# Patient Record
Sex: FEMALE | Race: BLACK OR AFRICAN AMERICAN | NOT HISPANIC OR LATINO | Employment: UNEMPLOYED | RURAL
[De-identification: names, ages, dates, MRNs, and addresses within clinical notes are randomized per-mention and may not be internally consistent; named-entity substitution may affect disease eponyms.]

---

## 2022-11-23 ENCOUNTER — OFFICE VISIT (OUTPATIENT)
Dept: FAMILY MEDICINE | Facility: CLINIC | Age: 22
End: 2022-11-23
Payer: COMMERCIAL

## 2022-11-23 VITALS
OXYGEN SATURATION: 99 % | HEART RATE: 107 BPM | RESPIRATION RATE: 18 BRPM | DIASTOLIC BLOOD PRESSURE: 80 MMHG | TEMPERATURE: 98 F | SYSTOLIC BLOOD PRESSURE: 110 MMHG | WEIGHT: 126 LBS

## 2022-11-23 DIAGNOSIS — J01.00 ACUTE NON-RECURRENT MAXILLARY SINUSITIS: Primary | ICD-10-CM

## 2022-11-23 DIAGNOSIS — R68.89 INFLUENZA-LIKE SYMPTOMS: ICD-10-CM

## 2022-11-23 LAB
CTP QC/QA: YES
FLUAV AG NPH QL: NEGATIVE
FLUBV AG NPH QL: NEGATIVE

## 2022-11-23 PROCEDURE — 1159F PR MEDICATION LIST DOCUMENTED IN MEDICAL RECORD: ICD-10-PCS | Mod: CPTII,,, | Performed by: NURSE PRACTITIONER

## 2022-11-23 PROCEDURE — 3079F PR MOST RECENT DIASTOLIC BLOOD PRESSURE 80-89 MM HG: ICD-10-PCS | Mod: CPTII,,, | Performed by: NURSE PRACTITIONER

## 2022-11-23 PROCEDURE — 1159F MED LIST DOCD IN RCRD: CPT | Mod: CPTII,,, | Performed by: NURSE PRACTITIONER

## 2022-11-23 PROCEDURE — 99203 PR OFFICE/OUTPT VISIT, NEW, LEVL III, 30-44 MIN: ICD-10-PCS | Mod: 25,,, | Performed by: NURSE PRACTITIONER

## 2022-11-23 PROCEDURE — 99203 OFFICE O/P NEW LOW 30 MIN: CPT | Mod: 25,,, | Performed by: NURSE PRACTITIONER

## 2022-11-23 PROCEDURE — 3074F SYST BP LT 130 MM HG: CPT | Mod: CPTII,,, | Performed by: NURSE PRACTITIONER

## 2022-11-23 PROCEDURE — 96372 THER/PROPH/DIAG INJ SC/IM: CPT | Mod: ,,, | Performed by: NURSE PRACTITIONER

## 2022-11-23 PROCEDURE — 3074F PR MOST RECENT SYSTOLIC BLOOD PRESSURE < 130 MM HG: ICD-10-PCS | Mod: CPTII,,, | Performed by: NURSE PRACTITIONER

## 2022-11-23 PROCEDURE — 96372 PR INJECTION,THERAP/PROPH/DIAG2ST, IM OR SUBCUT: ICD-10-PCS | Mod: ,,, | Performed by: NURSE PRACTITIONER

## 2022-11-23 PROCEDURE — 87804 POCT INFLUENZA A/B: ICD-10-PCS | Mod: QW,,, | Performed by: NURSE PRACTITIONER

## 2022-11-23 PROCEDURE — 87804 INFLUENZA ASSAY W/OPTIC: CPT | Mod: QW,,, | Performed by: NURSE PRACTITIONER

## 2022-11-23 PROCEDURE — 3079F DIAST BP 80-89 MM HG: CPT | Mod: CPTII,,, | Performed by: NURSE PRACTITIONER

## 2022-11-23 RX ORDER — BETAMETHASONE SODIUM PHOSPHATE AND BETAMETHASONE ACETATE 3; 3 MG/ML; MG/ML
6 INJECTION, SUSPENSION INTRA-ARTICULAR; INTRALESIONAL; INTRAMUSCULAR; SOFT TISSUE
Status: COMPLETED | OUTPATIENT
Start: 2022-11-23 | End: 2022-11-23

## 2022-11-23 RX ORDER — CEFTRIAXONE 1 G/1
1 INJECTION, POWDER, FOR SOLUTION INTRAMUSCULAR; INTRAVENOUS
Status: COMPLETED | OUTPATIENT
Start: 2022-11-23 | End: 2022-11-23

## 2022-11-23 RX ADMIN — BETAMETHASONE SODIUM PHOSPHATE AND BETAMETHASONE ACETATE 6 MG: 3; 3 INJECTION, SUSPENSION INTRA-ARTICULAR; INTRALESIONAL; INTRAMUSCULAR; SOFT TISSUE at 01:11

## 2022-11-23 RX ADMIN — CEFTRIAXONE 1 G: 1 INJECTION, POWDER, FOR SOLUTION INTRAMUSCULAR; INTRAVENOUS at 01:11

## 2022-11-23 NOTE — PROGRESS NOTES
SOPHIA Fernandez   [facility] [address] [place] [address]  [phone]      PATIENT NAME: Gunner Phillips  : 2000  DATE: 22  MRN: [id number]      Billing Provider: SOPHIA Fernandez  Level of Service:   Patient PCP Information       Provider PCP Type    SOPHIA Fernandez General            Reason for Visit / Chief Complaint: Sinusitis       Update PCP  Update Chief Complaint         History of Present Illness / Problem Focused Workflow     Gunner Phillips presents to the clinic with Sinusitis     Pt presents for sinus congestion, pressure and cough. S/s began 2 days ago. No fever no chills no body aches    Flu negative       Review of Systems     Review of Systems   Constitutional:  Negative for chills, fatigue and fever.   HENT:  Positive for nasal congestion, rhinorrhea and sinus pressure/congestion.    Respiratory:  Positive for cough. Negative for shortness of breath.       Medical / Social / Family History   No past medical history on file.    No past surgical history on file.    Social History  Ms. Phillips      Family History  Ms. Phillips's family history is not on file.    Medications and Allergies     Medications  No outpatient medications have been marked as taking for the 22 encounter (Office Visit) with SOPHIA Fernandez.     Current Facility-Administered Medications for the 22 encounter (Office Visit) with SOPHIA Fernandez   Medication Dose Route Frequency Provider Last Rate Last Admin    [COMPLETED] betamethasone acetate-betamethasone sodium phosphate injection 6 mg  6 mg Intramuscular 1 time in Clinic/HOD SOPHIA Fernandez   6 mg at 22 1352    [COMPLETED] cefTRIAXone injection 1 g  1 g Intramuscular 1 time in Clinic/HOD SOPHIA Fernandez   1 g at 22 1353       Allergies  Review of patient's allergies indicates:  No Known  Allergies    Physical Examination     Vitals:    11/23/22 1323   BP: 110/80   Pulse: 107   Resp: 18   Temp: 98 °F (36.7 °C)     Physical Exam  HENT:      Nose: Congestion present.   Eyes:      Pupils: Pupils are equal, round, and reactive to light.   Cardiovascular:      Rate and Rhythm: Normal rate and regular rhythm.      Heart sounds: No murmur heard.  Pulmonary:      Breath sounds: Normal breath sounds. No wheezing, rhonchi or rales.   Abdominal:      General: Bowel sounds are normal.   Musculoskeletal:         General: No swelling.      Cervical back: Normal range of motion and neck supple.   Neurological:      Mental Status: She is alert and oriented to person, place, and time.        Assessment and Plan (including Health Maintenance)      Problem List  Smart Sets  Document Outside HM   :    Plan:     Uri/sinusitis     Health Maintenance Due   Topic Date Due    Hepatitis C Screening  Never done    Lipid Panel  Never done    COVID-19 Vaccine (1) Never done    HPV Vaccines (1 - 2-dose series) Never done    HIV Screening  Never done    TETANUS VACCINE  Never done    Pap Smear  Never done    Influenza Vaccine (1) Never done       Problem List Items Addressed This Visit    None  Visit Diagnoses       Acute non-recurrent maxillary sinusitis    -  Primary    Relevant Medications    cefTRIAXone injection 1 g (Completed)    betamethasone acetate-betamethasone sodium phosphate injection 6 mg (Completed)    Influenza-like symptoms        Relevant Orders    POCT Influenza A/B (Completed)            The patient has no Health Maintenance topics of status Not Due    No future appointments.         Signature:  SOPHIA Fernandez [facility] [address] [address]  [phone]    Date of encounter: 11/23/22

## 2023-02-01 ENCOUNTER — OFFICE VISIT (OUTPATIENT)
Dept: FAMILY MEDICINE | Facility: CLINIC | Age: 23
End: 2023-02-01
Payer: COMMERCIAL

## 2023-02-01 VITALS
OXYGEN SATURATION: 98 % | WEIGHT: 127 LBS | DIASTOLIC BLOOD PRESSURE: 77 MMHG | RESPIRATION RATE: 16 BRPM | TEMPERATURE: 98 F | SYSTOLIC BLOOD PRESSURE: 116 MMHG | HEART RATE: 96 BPM

## 2023-02-01 DIAGNOSIS — Z20.822 EXPOSURE TO COVID-19 VIRUS: ICD-10-CM

## 2023-02-01 DIAGNOSIS — R05.9 COUGH, UNSPECIFIED TYPE: Primary | ICD-10-CM

## 2023-02-01 LAB
CTP QC/QA: YES
SARS-COV-2 AG RESP QL IA.RAPID: NEGATIVE

## 2023-02-01 PROCEDURE — 3074F PR MOST RECENT SYSTOLIC BLOOD PRESSURE < 130 MM HG: ICD-10-PCS | Mod: CPTII,,, | Performed by: NURSE PRACTITIONER

## 2023-02-01 PROCEDURE — 87426 SARS CORONAVIRUS 2 ANTIGEN POCT: ICD-10-PCS | Mod: QW,,, | Performed by: NURSE PRACTITIONER

## 2023-02-01 PROCEDURE — 1159F PR MEDICATION LIST DOCUMENTED IN MEDICAL RECORD: ICD-10-PCS | Mod: CPTII,,, | Performed by: NURSE PRACTITIONER

## 2023-02-01 PROCEDURE — 3078F PR MOST RECENT DIASTOLIC BLOOD PRESSURE < 80 MM HG: ICD-10-PCS | Mod: CPTII,,, | Performed by: NURSE PRACTITIONER

## 2023-02-01 PROCEDURE — 99213 PR OFFICE/OUTPT VISIT, EST, LEVL III, 20-29 MIN: ICD-10-PCS | Mod: ,,, | Performed by: NURSE PRACTITIONER

## 2023-02-01 PROCEDURE — 3074F SYST BP LT 130 MM HG: CPT | Mod: CPTII,,, | Performed by: NURSE PRACTITIONER

## 2023-02-01 PROCEDURE — 87426 SARSCOV CORONAVIRUS AG IA: CPT | Mod: QW,,, | Performed by: NURSE PRACTITIONER

## 2023-02-01 PROCEDURE — 99213 OFFICE O/P EST LOW 20 MIN: CPT | Mod: ,,, | Performed by: NURSE PRACTITIONER

## 2023-02-01 PROCEDURE — 3078F DIAST BP <80 MM HG: CPT | Mod: CPTII,,, | Performed by: NURSE PRACTITIONER

## 2023-02-01 PROCEDURE — 1159F MED LIST DOCD IN RCRD: CPT | Mod: CPTII,,, | Performed by: NURSE PRACTITIONER

## 2023-02-01 RX ORDER — AZITHROMYCIN 250 MG/1
TABLET, FILM COATED ORAL
Qty: 6 TABLET | Refills: 0 | Status: SHIPPED | OUTPATIENT
Start: 2023-02-01 | End: 2023-02-06

## 2023-02-01 RX ORDER — CETIRIZINE HYDROCHLORIDE 10 MG/1
10 TABLET ORAL DAILY
Qty: 30 TABLET | Refills: 11 | Status: SHIPPED | OUTPATIENT
Start: 2023-02-01 | End: 2024-02-01

## 2023-02-01 RX ORDER — FLUTICASONE PROPIONATE 50 MCG
1 SPRAY, SUSPENSION (ML) NASAL DAILY
Qty: 16 G | Refills: 3 | Status: SHIPPED | OUTPATIENT
Start: 2023-02-01

## 2023-02-01 NOTE — PROGRESS NOTES
SOPHIA Fernandez   [facility] [address] [place] [address]  [phone]      PATIENT NAME: Gunner Phillips  : 2000  DATE: 23  MRN: [id number]      Billing Provider: SOPHIA Fernandez  Level of Service:   Patient PCP Information       Provider PCP Type    SOPHIA Fernandez General            Reason for Visit / Chief Complaint: Sinus Problem, Cough, and exposure to covid       Update PCP  Update Chief Complaint         History of Present Illness / Problem Focused Workflow     Gunner Phillips presents to the clinic with Sinus Problem, Cough, and exposure to covid     Pt presents with sinus congestion, pressure and cough. No fever, chills or bodyaches. No loss of taste or smell.     Recommend change toothbrush and bed linens  Avoid sharing food or drinks  Encourage oral hydration.         Review of Systems     Review of Systems   Constitutional:  Negative for fatigue and fever.   HENT:  Negative for nasal congestion and sore throat.    Eyes:  Negative for visual disturbance.   Respiratory:  Negative for chest tightness and shortness of breath.    Cardiovascular:  Negative for chest pain and leg swelling.   Gastrointestinal:  Negative for abdominal pain, change in bowel habit and change in bowel habit.   Endocrine: Negative for polydipsia, polyphagia and polyuria.   Genitourinary:  Negative for dysuria and hematuria.   Musculoskeletal:  Negative for back pain and leg pain.   Integumentary:  Negative for rash.   Neurological:  Negative for dizziness, syncope, weakness and light-headedness.      Medical / Social / Family History   No past medical history on file.    No past surgical history on file.    Social History  Ms. Phillips      Family History  Ms. Phillips's family history is not on file.    Medications and Allergies     Medications  No outpatient medications have been marked as taking  for the 2/1/23 encounter (Office Visit) with SOPHIA Fernandez.       Allergies  Review of patient's allergies indicates:  No Known Allergies    Physical Examination     Vitals:    02/01/23 1346   BP: 116/77   Pulse: 96   Resp: 16   Temp: 97.7 °F (36.5 °C)     Physical Exam  HENT:      Nose: Congestion present.   Eyes:      Pupils: Pupils are equal, round, and reactive to light.   Cardiovascular:      Rate and Rhythm: Normal rate and regular rhythm.      Heart sounds: Normal heart sounds. No murmur heard.  Pulmonary:      Breath sounds: Normal breath sounds. No wheezing, rhonchi or rales.   Abdominal:      General: Bowel sounds are normal.   Musculoskeletal:         General: No swelling.      Cervical back: Normal range of motion and neck supple.   Skin:     General: Skin is warm and dry.   Neurological:      Mental Status: She is alert and oriented to person, place, and time.        Assessment and Plan (including Health Maintenance)      Problem List  Smart Sets  Document Outside HM   :    Plan:   1. Cough, unspecified type  -     SARS Coronavirus 2 Antigen, POCT    2. Exposure to COVID-19 virus  -     SARS Coronavirus 2 Antigen, POCT    Other orders  -     azithromycin (Z-LINNEA) 250 MG tablet; Take 2 tablets by mouth on day 1; Take 1 tablet by mouth on days 2-5  Dispense: 6 tablet; Refill: 0  -     fluticasone propionate (FLONASE) 50 mcg/actuation nasal spray; 1 spray (50 mcg total) by Each Nostril route once daily.  Dispense: 16 g; Refill: 3  -     cetirizine (ZYRTEC) 10 MG tablet; Take 1 tablet (10 mg total) by mouth once daily.  Dispense: 30 tablet; Refill: 11           Health Maintenance Due   Topic Date Due    Hepatitis C Screening  Never done    Lipid Panel  Never done    COVID-19 Vaccine (1) Never done    HPV Vaccines (1 - 2-dose series) Never done    HIV Screening  Never done    TETANUS VACCINE  Never done    Pap Smear  Never done    Influenza Vaccine (1) Never done       Problem List Items Addressed  This Visit    None  Visit Diagnoses       Cough, unspecified type    -  Primary    Relevant Orders    SARS Coronavirus 2 Antigen, POCT (Completed)    Exposure to COVID-19 virus        Relevant Orders    SARS Coronavirus 2 Antigen, POCT (Completed)            The patient has no Health Maintenance topics of status Not Due    No future appointments.         Signature:  SOPHIA Fernandez [facility] [address] [place] [address]  [phone]    Date of encounter: 2/1/23

## 2023-03-18 ENCOUNTER — OFFICE VISIT (OUTPATIENT)
Dept: FAMILY MEDICINE | Facility: CLINIC | Age: 23
End: 2023-03-18
Payer: COMMERCIAL

## 2023-03-18 VITALS
SYSTOLIC BLOOD PRESSURE: 114 MMHG | HEIGHT: 64 IN | HEART RATE: 88 BPM | TEMPERATURE: 98 F | OXYGEN SATURATION: 99 % | WEIGHT: 127 LBS | DIASTOLIC BLOOD PRESSURE: 72 MMHG | BODY MASS INDEX: 21.68 KG/M2 | RESPIRATION RATE: 18 BRPM

## 2023-03-18 DIAGNOSIS — R60.0 LEG EDEMA, RIGHT: ICD-10-CM

## 2023-03-18 DIAGNOSIS — S93.401A SPRAIN OF RIGHT ANKLE, UNSPECIFIED LIGAMENT, INITIAL ENCOUNTER: Primary | ICD-10-CM

## 2023-03-18 DIAGNOSIS — M25.571 ACUTE RIGHT ANKLE PAIN: ICD-10-CM

## 2023-03-18 LAB — D DIMER PPP FEU-MCNC: 1.79 ΜG/ML (ref 0–0.47)

## 2023-03-18 PROCEDURE — 1160F RVW MEDS BY RX/DR IN RCRD: CPT | Mod: ,,, | Performed by: FAMILY MEDICINE

## 2023-03-18 PROCEDURE — 3008F BODY MASS INDEX DOCD: CPT | Mod: ,,, | Performed by: FAMILY MEDICINE

## 2023-03-18 PROCEDURE — 1160F PR REVIEW ALL MEDS BY PRESCRIBER/CLIN PHARMACIST DOCUMENTED: ICD-10-PCS | Mod: ,,, | Performed by: FAMILY MEDICINE

## 2023-03-18 PROCEDURE — 3074F PR MOST RECENT SYSTOLIC BLOOD PRESSURE < 130 MM HG: ICD-10-PCS | Mod: ,,, | Performed by: FAMILY MEDICINE

## 2023-03-18 PROCEDURE — 1159F MED LIST DOCD IN RCRD: CPT | Mod: ,,, | Performed by: FAMILY MEDICINE

## 2023-03-18 PROCEDURE — 99051 PR MEDICAL SERVICES, EVE/WKEND/HOLIDAY: ICD-10-PCS | Mod: ,,, | Performed by: FAMILY MEDICINE

## 2023-03-18 PROCEDURE — 96372 PR INJECTION,THERAP/PROPH/DIAG2ST, IM OR SUBCUT: ICD-10-PCS | Mod: ,,, | Performed by: FAMILY MEDICINE

## 2023-03-18 PROCEDURE — 96372 THER/PROPH/DIAG INJ SC/IM: CPT | Mod: ,,, | Performed by: FAMILY MEDICINE

## 2023-03-18 PROCEDURE — 99213 OFFICE O/P EST LOW 20 MIN: CPT | Mod: 25,,, | Performed by: FAMILY MEDICINE

## 2023-03-18 PROCEDURE — 3078F PR MOST RECENT DIASTOLIC BLOOD PRESSURE < 80 MM HG: ICD-10-PCS | Mod: ,,, | Performed by: FAMILY MEDICINE

## 2023-03-18 PROCEDURE — 99051 MED SERV EVE/WKEND/HOLIDAY: CPT | Mod: ,,, | Performed by: FAMILY MEDICINE

## 2023-03-18 PROCEDURE — 99213 PR OFFICE/OUTPT VISIT, EST, LEVL III, 20-29 MIN: ICD-10-PCS | Mod: 25,,, | Performed by: FAMILY MEDICINE

## 2023-03-18 PROCEDURE — 1159F PR MEDICATION LIST DOCUMENTED IN MEDICAL RECORD: ICD-10-PCS | Mod: ,,, | Performed by: FAMILY MEDICINE

## 2023-03-18 PROCEDURE — 3008F PR BODY MASS INDEX (BMI) DOCUMENTED: ICD-10-PCS | Mod: ,,, | Performed by: FAMILY MEDICINE

## 2023-03-18 PROCEDURE — 3074F SYST BP LT 130 MM HG: CPT | Mod: ,,, | Performed by: FAMILY MEDICINE

## 2023-03-18 PROCEDURE — 3078F DIAST BP <80 MM HG: CPT | Mod: ,,, | Performed by: FAMILY MEDICINE

## 2023-03-18 RX ORDER — DICLOFENAC SODIUM 75 MG/1
75 TABLET, DELAYED RELEASE ORAL 2 TIMES DAILY PRN
Qty: 20 TABLET | Refills: 0 | Status: SHIPPED | OUTPATIENT
Start: 2023-03-18

## 2023-03-18 RX ORDER — KETOROLAC TROMETHAMINE 30 MG/ML
30 INJECTION, SOLUTION INTRAMUSCULAR; INTRAVENOUS
Status: COMPLETED | OUTPATIENT
Start: 2023-03-18 | End: 2023-03-18

## 2023-03-18 RX ORDER — PREDNISONE 20 MG/1
20 TABLET ORAL DAILY
Qty: 5 TABLET | Refills: 0 | Status: SHIPPED | OUTPATIENT
Start: 2023-03-18 | End: 2023-03-23

## 2023-03-18 RX ADMIN — KETOROLAC TROMETHAMINE 30 MG: 30 INJECTION, SOLUTION INTRAMUSCULAR; INTRAVENOUS at 11:03
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
## 2023-03-19 DIAGNOSIS — R79.89 D-DIMER, ELEVATED: Primary | ICD-10-CM

## 2023-03-20 ENCOUNTER — HOSPITAL ENCOUNTER (OUTPATIENT)
Dept: RADIOLOGY | Facility: HOSPITAL | Age: 23
Discharge: HOME | End: 2023-03-20
Attending: FAMILY MEDICINE
Payer: COMMERCIAL

## 2023-03-20 DIAGNOSIS — R79.89 D-DIMER, ELEVATED: ICD-10-CM

## 2023-03-20 PROCEDURE — 93971 US LOWER EXTREMITY VEINS RIGHT: ICD-10-PCS | Mod: 26,RT,, | Performed by: RADIOLOGY

## 2023-03-20 PROCEDURE — 93971 EXTREMITY STUDY: CPT | Mod: TC,RT

## 2023-03-20 PROCEDURE — 93971 EXTREMITY STUDY: CPT | Mod: 26,RT,, | Performed by: RADIOLOGY

## 2023-03-27 DIAGNOSIS — M25.571 ACUTE RIGHT ANKLE PAIN: Primary | ICD-10-CM

## 2023-03-28 ENCOUNTER — OFFICE VISIT (OUTPATIENT)
Dept: ORTHOPEDICS | Facility: CLINIC | Age: 23
End: 2023-03-28
Payer: COMMERCIAL

## 2023-03-28 ENCOUNTER — HOSPITAL ENCOUNTER (OUTPATIENT)
Dept: RADIOLOGY | Facility: HOSPITAL | Age: 23
Discharge: HOME | End: 2023-03-28
Attending: ORTHOPAEDIC SURGERY
Payer: COMMERCIAL

## 2023-03-28 DIAGNOSIS — S93.401A SPRAIN OF RIGHT ANKLE, UNSPECIFIED LIGAMENT, INITIAL ENCOUNTER: ICD-10-CM

## 2023-03-28 DIAGNOSIS — M25.571 ACUTE RIGHT ANKLE PAIN: ICD-10-CM

## 2023-03-28 PROCEDURE — 99213 OFFICE O/P EST LOW 20 MIN: CPT | Mod: PBBFAC | Performed by: ORTHOPAEDIC SURGERY

## 2023-03-28 PROCEDURE — 99203 PR OFFICE/OUTPT VISIT, NEW, LEVL III, 30-44 MIN: ICD-10-PCS | Mod: S$PBB,,, | Performed by: ORTHOPAEDIC SURGERY

## 2023-03-28 PROCEDURE — 73610 X-RAY EXAM OF ANKLE: CPT | Mod: 26,RT,, | Performed by: ORTHOPAEDIC SURGERY

## 2023-03-28 PROCEDURE — 73610 X-RAY EXAM OF ANKLE: CPT | Mod: TC,RT

## 2023-03-28 PROCEDURE — 73610 XR ANKLE COMPLETE 3 VIEW RIGHT: ICD-10-PCS | Mod: 26,RT,, | Performed by: ORTHOPAEDIC SURGERY

## 2023-03-28 PROCEDURE — 99203 OFFICE O/P NEW LOW 30 MIN: CPT | Mod: S$PBB,,, | Performed by: ORTHOPAEDIC SURGERY
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
## 2023-04-05 ENCOUNTER — CLINICAL SUPPORT (OUTPATIENT)
Dept: REHABILITATION | Facility: HOSPITAL | Age: 23
End: 2023-04-05
Payer: COMMERCIAL

## 2023-04-05 DIAGNOSIS — R26.2 DIFFICULTY WALKING: ICD-10-CM

## 2023-04-05 DIAGNOSIS — S93.401A SPRAIN OF RIGHT ANKLE, UNSPECIFIED LIGAMENT, INITIAL ENCOUNTER: ICD-10-CM

## 2023-04-05 DIAGNOSIS — M25.671 STIFFNESS OF RIGHT ANKLE JOINT: ICD-10-CM

## 2023-04-05 DIAGNOSIS — M25.571 ACUTE RIGHT ANKLE PAIN: Primary | ICD-10-CM

## 2023-04-05 PROCEDURE — 97110 THERAPEUTIC EXERCISES: CPT

## 2023-04-05 PROCEDURE — 97140 MANUAL THERAPY 1/> REGIONS: CPT

## 2023-04-05 PROCEDURE — 97162 PT EVAL MOD COMPLEX 30 MIN: CPT
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
## 2023-04-17 ENCOUNTER — CLINICAL SUPPORT (OUTPATIENT)
Dept: REHABILITATION | Facility: HOSPITAL | Age: 23
End: 2023-04-17
Payer: COMMERCIAL

## 2023-04-17 DIAGNOSIS — S93.401A SPRAIN OF RIGHT ANKLE, UNSPECIFIED LIGAMENT, INITIAL ENCOUNTER: Primary | ICD-10-CM

## 2023-04-17 PROCEDURE — 97110 THERAPEUTIC EXERCISES: CPT | Mod: CQ

## 2023-04-17 PROCEDURE — 97140 MANUAL THERAPY 1/> REGIONS: CPT | Mod: CQ

## 2023-04-17 PROCEDURE — 97112 NEUROMUSCULAR REEDUCATION: CPT | Mod: CQ

## 2023-04-17 NOTE — PROGRESS NOTES
[facility]   Physical Therapy Treatment Note     Name: Gunner Phillips  Clinic Number: [id number]    Therapy Diagnosis:   Encounter Diagnosis   Name Primary?    Sprain of right ankle, unspecified ligament, initial encounter Yes     Physician: Alonzo Sanz III, MD    Visit Date: 4/17/2023    Encounter Diagnosis   Name Primary?    Sprain of right ankle, unspecified ligament, initial encounter        Physician: Alonzo Sanz III, MD     Physician Orders: PT Eval and Treat  Medical Diagnosis from Referral: sprain of right ankle, unspecified ligament.   Evaluation Date: 4/5/2023  Updated Plan of Care Due : 05/05/23  Authorization Period Expiration: 03/27/24  Plan of Care Expiration: 05/05/23    Visit # / Visits authorized: 2 / 20  PTA Visit #: 1/5     Time In: 4:40 pm  Time Out: 5:13 pm  Total Billable Time: 33 minutes    SUBJECTIVE     Pt reports: she is doing good. Some pain today. Reports she gets out of the boot daily.  She was compliant with home exercise program.  Response to previous treatment: First since eval  Functional change: None    Pain: 3/10  Location: right ankles     Prior Level of Function: No limitations  Current Level of Function: WBAT in boot currently     OBJECTIVE     Objective Measures updated at progress report unless specified.     Treatment     Gunner received the treatments listed below:      therapeutic exercises to develop strength, endurance, ROM, and flexibility for 13 minutes including:  Bike x 3 minutes  SB Stretch 3x20 seconds    Calf Raises 20x  Seated soleus raises - 3 plates 20x  Single leg bridges - 30x        manual therapy techniques: Joint mobilizations and Manual traction were applied to the: ankle for 8 minutes, including:  Joint mobs  Joint distraction  Ankle PROM    neuromuscular re-education activities to improve: Balance, Kinesthetic, and Proprioception for 15 minutes. The following activities were included:  Single Leg Balance on Foam  "Pad - 3x30 seconds  Ankle Inversion with Band - green 20x  Ankle Eversion with Band - green 20x  Leg Press (R) 4 plates 20x with rock onto heels  Lateral Steps 6" 20x    therapeutic activities to improve functional performance for 0  minutes, including:      gait training to improve functional mobility and safety for 0  minutes, including:        Patient Education and Home Exercises     Home Exercises Provided and Patient Education Provided     Education provided:   - None    Written Home Exercises Provided: Patient instructed to cont prior HEP. Exercises were reviewed and Gunner was able to demonstrate them prior to the end of the session.  Gunner demonstrated good  understanding of the education provided. See EMR under Patient Instructions for exercises provided during therapy sessions    ASSESSMENT     Pt is doing well and able to get out of the boot entire session. Pt has antalgic gait pattern when ambulating. Pt has limited ankle mobility especially during inversion/eversion. Pt is doing well and encouraged to get out of the boot more and more. Will continue to progress as able.       PMH:  Gunner is a 22 y.o. female referred to outpatient Physical Therapy with a medical diagnosis of sprain of R ankle. Patient presents with signs and symptoms consistent with this diagnosis at this point really just seeming to be dealing with stiffness and weakness from prolonged boot usage.  Discussed with pt and her mom getting a foot orthotic and weaning out of the boot.  Have her going back to a single crutch first two days out of the boot and then will have her walking in just lace up brace from there.  With decreased R ankle ROM, strength, flexibility, and functional capacity which will be addressed through skilled therapy at this time.     Gunner Is progressing towards her goals.   Pt prognosis is Good.     Pt will continue to benefit from skilled outpatient physical therapy to address the deficits listed in the problem " "list box on initial evaluation, provide pt/family education and to maximize pt's level of independence in the home and community environment.     Pt's spiritual, cultural and educational needs considered and pt agreeable to plan of care and goals.     Anticipated barriers to physical therapy: None    Goals: Short Term Goals: 2 weeks   Pt will be independent with HEP for continued progression outside of skilled therapy.  Pt will be able to amb I without pain or deviation out of boot and without crutch without pain or limitation from R LE.  Pt will demonstrate 10 deg dorsiflexion without pain to show sufficient dorsiflexion range for functional activities.      Long Term Goals: 4 weeks   Pt will be able to perform a lateral step down from a 6" step without pain or limitation from R LE.  Pt will be able to perform 20 single leg calf raises to demonstrate 5/5 plantar flexor strength.  Pt will be able to perform 30 ankle hops without pain or limitation to allow for safe return to basketball.   Pt will be able to perform anterior Y balance reach test within 2cm of uninvolved side to demonstrate decreased risk for future injury.     PLAN     Plan of care Certification: 4/5/2023 to 5/5/23.     Outpatient Physical Therapy 2 times weekly for 4 weeks to include the following interventions: Manual Therapy, Neuromuscular Re-ed, Patient Education, Therapeutic Activities, and Therapeutic Exercise.        Brennen Bear, PTA   04/17/2023     "

## 2023-04-19 ENCOUNTER — CLINICAL SUPPORT (OUTPATIENT)
Dept: REHABILITATION | Facility: HOSPITAL | Age: 23
End: 2023-04-19
Payer: COMMERCIAL

## 2023-04-19 DIAGNOSIS — S93.401A SPRAIN OF RIGHT ANKLE, UNSPECIFIED LIGAMENT, INITIAL ENCOUNTER: Primary | ICD-10-CM

## 2023-04-19 PROCEDURE — 97140 MANUAL THERAPY 1/> REGIONS: CPT | Mod: CQ

## 2023-04-19 PROCEDURE — 97110 THERAPEUTIC EXERCISES: CPT | Mod: CQ

## 2023-04-19 PROCEDURE — 97112 NEUROMUSCULAR REEDUCATION: CPT | Mod: CQ

## 2023-04-19 NOTE — PROGRESS NOTES
"[facility]   Physical Therapy Treatment Note     Name: Gunner Phillips  Clinic Number: [id number]    Therapy Diagnosis:   Encounter Diagnosis   Name Primary?    Sprain of right ankle, unspecified ligament, initial encounter Yes     Physician: Alonzo Sanz III, MD    Visit Date: 4/19/2023    Encounter Diagnosis   Name Primary?    Sprain of right ankle, unspecified ligament, initial encounter        Physician: Alonzo Sanz III, MD     Physician Orders: PT Eval and Treat  Medical Diagnosis from Referral: sprain of right ankle, unspecified ligament.   Evaluation Date: 4/5/2023  Updated Plan of Care Due : 05/05/23  Authorization Period Expiration: 03/27/24  Plan of Care Expiration: 05/05/23    Visit # / Visits authorized: 3 / 20  PTA Visit #: 2/5     Time In: 4:48 pm  Time Out: 5:23 pm  Total Billable Time: 35 minutes    SUBJECTIVE     Pt reports: she is doing good. Some pain today. Reports she gets out of the boot daily.  She was compliant with home exercise program.  Response to previous treatment: First since eval  Functional change: None    Pain: 3/10  Location: right ankles     Prior Level of Function: No limitations  Current Level of Function: WBAT in boot currently     OBJECTIVE     Objective Measures updated at progress report unless specified.     Treatment     Gunner received the treatments listed below:      therapeutic exercises to develop strength, endurance, ROM, and flexibility for 12 minutes including:  Bike x 5 minutes  SB Stretch 3x20 seconds    Calf Raises 30x  Seated soleus raises - 3 plates 20x  Single leg bridges - 30x  Lateral Steps 6" 20x      manual therapy techniques: Joint mobilizations and Manual traction were applied to the: ankle for 8 minutes, including:  Joint mobs  Joint distraction  Ankle PROM    neuromuscular re-education activities to improve: Balance, Kinesthetic, and Proprioception for 15 minutes. The following activities were included:  Single " Leg Balance on Foam Pad - 3x30 seconds  Ankle Inversion with Band - green 20x  Ankle Eversion with Band - green 20x  Leg Press (R) 4 plates 20x with rock onto heels  Rebounds x 30 single leg    therapeutic activities to improve functional performance for 0  minutes, including:      gait training to improve functional mobility and safety for 0  minutes, including:        Patient Education and Home Exercises     Home Exercises Provided and Patient Education Provided     Education provided:   - None    Written Home Exercises Provided: Patient instructed to cont prior HEP. Exercises were reviewed and Gunner was able to demonstrate them prior to the end of the session.  Gunner demonstrated good  understanding of the education provided. See EMR under Patient Instructions for exercises provided during therapy sessions    ASSESSMENT     Pt is doing well and making great progress towards her goals. Still a little tender over ATFL but no wincing present with testing. Pt has some pain with resisted eversion. Pt able to perform unsupported balance exercises today. Will continue to progress as able.       PMH:  Gunner is a 22 y.o. female referred to outpatient Physical Therapy with a medical diagnosis of sprain of R ankle. Patient presents with signs and symptoms consistent with this diagnosis at this point really just seeming to be dealing with stiffness and weakness from prolonged boot usage.  Discussed with pt and her mom getting a foot orthotic and weaning out of the boot.  Have her going back to a single crutch first two days out of the boot and then will have her walking in just lace up brace from there.  With decreased R ankle ROM, strength, flexibility, and functional capacity which will be addressed through skilled therapy at this time.     Gunner Is progressing towards her goals.   Pt prognosis is Good.     Pt will continue to benefit from skilled outpatient physical therapy to address the deficits listed in the  "problem list box on initial evaluation, provide pt/family education and to maximize pt's level of independence in the home and community environment.     Pt's spiritual, cultural and educational needs considered and pt agreeable to plan of care and goals.     Anticipated barriers to physical therapy: None    Goals: Short Term Goals: 2 weeks   Pt will be independent with HEP for continued progression outside of skilled therapy.  Pt will be able to amb I without pain or deviation out of boot and without crutch without pain or limitation from R LE.  Pt will demonstrate 10 deg dorsiflexion without pain to show sufficient dorsiflexion range for functional activities.      Long Term Goals: 4 weeks   Pt will be able to perform a lateral step down from a 6" step without pain or limitation from R LE.  Pt will be able to perform 20 single leg calf raises to demonstrate 5/5 plantar flexor strength.  Pt will be able to perform 30 ankle hops without pain or limitation to allow for safe return to basketball.   Pt will be able to perform anterior Y balance reach test within 2cm of uninvolved side to demonstrate decreased risk for future injury.     PLAN     Plan of care Certification: 4/5/2023 to 5/5/23.     Outpatient Physical Therapy 2 times weekly for 4 weeks to include the following interventions: Manual Therapy, Neuromuscular Re-ed, Patient Education, Therapeutic Activities, and Therapeutic Exercise.        Brennen Bear, PTA   04/19/2023       "

## 2023-04-24 ENCOUNTER — CLINICAL SUPPORT (OUTPATIENT)
Dept: REHABILITATION | Facility: HOSPITAL | Age: 23
End: 2023-04-24
Payer: COMMERCIAL

## 2023-04-24 DIAGNOSIS — M25.671 STIFFNESS OF RIGHT ANKLE JOINT: ICD-10-CM

## 2023-04-24 DIAGNOSIS — M25.571 ACUTE RIGHT ANKLE PAIN: Primary | ICD-10-CM

## 2023-04-24 DIAGNOSIS — R26.2 DIFFICULTY WALKING: ICD-10-CM

## 2023-04-24 PROCEDURE — 97140 MANUAL THERAPY 1/> REGIONS: CPT

## 2023-04-24 PROCEDURE — 97112 NEUROMUSCULAR REEDUCATION: CPT

## 2023-04-24 PROCEDURE — 97530 THERAPEUTIC ACTIVITIES: CPT

## 2023-04-24 PROCEDURE — 97110 THERAPEUTIC EXERCISES: CPT

## 2023-04-25 NOTE — PROGRESS NOTES
[facility]   Physical Therapy Treatment Note     Name: Gunner Phillips  Clinic Number: [id number]    Therapy Diagnosis:   No diagnosis found.    Physician: Alonzo Sanz III, MD    Visit Date: 4/24/2023    Encounter Diagnosis   Name Primary?    Sprain of right ankle, unspecified ligament, initial encounter        Physician: Alonzo Sanz III, MD     Physician Orders: PT Eval and Treat  Medical Diagnosis from Referral: sprain of right ankle, unspecified ligament.   Evaluation Date: 4/5/2023  Updated Plan of Care Due : 05/05/23  Authorization Period Expiration: 03/27/24  Plan of Care Expiration: 05/05/23    Visit # / Visits authorized: 4/ 20  PTA Visit #: 0/5     Time In: 4:37 pm  Time Out: 5:18 pm  Total Billable Time: 41 minutes    SUBJECTIVE     Pt reports: reports ankle feeling good at this point no longer using the boot.  Still wearing lace up brace full time.   She was compliant with home exercise program.  Response to previous treatment: good response with mod soreness after.   Functional change: Out of boot    Prior Level of Function: No limitations  Current Level of Function: WBAT just in brace at this point    OBJECTIVE     Able to SLS for 15 seconds today without pain.     Treatment     Gunner received the treatments listed below:      therapeutic exercises to develop strength, endurance, ROM, and flexibility for 15 minutes including:  Bike x 5 minutes  Slantboard Stretch 4x10wbr  Ankle Circles x30 cw/ccw w/ grey band  Heel Raises at stairs 2x20  Heel Drop Stretch [id number]  Seated Heel Raises 2x20 w/ 40lb weight      manual therapy techniques: Joint mobilizations and Manual traction were applied to the: ankle for 8 minutes, including:  Joint mobs  Joint distraction  Ankle PROM    neuromuscular re-education activities to improve: Balance, Kinesthetic, and Proprioception for 8 minutes. The following activities were included:  Single Leg Balance on Foam Pad - 3x30  "seconds  Leg Press [id number] w/ controlled eccentric    therapeutic activities to improve functional performance for 10 minutes, including:  TRX Squats 3x10 w/ chair behind to improve squatting ability  Step Ups 4x5 to 6" to improve stair negotiation  Trampoline Bounces [id number] for slow progression to plyos.     Patient Education and Home Exercises     Home Exercises Provided and Patient Education Provided     Education provided:   - None    Written Home Exercises Provided: Patient instructed to cont prior HEP. Exercises were reviewed and Gunner was able to demonstrate them prior to the end of the session.  Gunner demonstrated good  understanding of the education provided. See EMR under Patient Instructions for exercises provided during therapy sessions    ASSESSMENT     Doing well at this point with good improvement now that she is moving freely out of the boot at home.  Do think as she continues to challenge herself with daily activities she will continue to make good natural progress.  Did well with all exercises performed today without pain but some difficulty due to tightness and weakness.  Will continue with current plan at this time but did discuss discharge with pt and her dad at next visit.  Will see how she responds to today's visit and make determination next time.     Gunner Is progressing towards her goals.   Pt prognosis is Good.     Pt will continue to benefit from skilled outpatient physical therapy to address the deficits listed in the problem list box on initial evaluation, provide pt/family education and to maximize pt's level of independence in the home and community environment.     Pt's spiritual, cultural and educational needs considered and pt agreeable to plan of care and goals.     Anticipated barriers to physical therapy: None    Goals: Short Term Goals: 2 weeks   Pt will be independent with HEP for continued progression outside of skilled therapy.  Pt will be able to amb I without pain " "or deviation out of boot and without crutch without pain or limitation from R LE.  Pt will demonstrate 10 deg dorsiflexion without pain to show sufficient dorsiflexion range for functional activities.      Long Term Goals: 4 weeks   Pt will be able to perform a lateral step down from a 6" step without pain or limitation from R LE.  Pt will be able to perform 20 single leg calf raises to demonstrate 5/5 plantar flexor strength.  Pt will be able to perform 30 ankle hops without pain or limitation to allow for safe return to basketball.   Pt will be able to perform anterior Y balance reach test within 2cm of uninvolved side to demonstrate decreased risk for future injury.     PLAN     Plan of care Certification: 4/5/2023 to 5/5/23.     Outpatient Physical Therapy 2 times weekly for 4 weeks to include the following interventions: Manual Therapy, Neuromuscular Re-ed, Patient Education, Therapeutic Activities, and Therapeutic Exercise.        Mathew Quinn, PT   04/25/2023         "

## 2023-04-26 ENCOUNTER — CLINICAL SUPPORT (OUTPATIENT)
Dept: REHABILITATION | Facility: HOSPITAL | Age: 23
End: 2023-04-26
Payer: COMMERCIAL

## 2023-04-26 DIAGNOSIS — M25.571 ACUTE RIGHT ANKLE PAIN: Primary | ICD-10-CM

## 2023-04-26 DIAGNOSIS — M25.671 STIFFNESS OF RIGHT ANKLE JOINT: ICD-10-CM

## 2023-04-26 DIAGNOSIS — R26.2 DIFFICULTY WALKING: ICD-10-CM

## 2023-04-26 DIAGNOSIS — S93.401A SPRAIN OF RIGHT ANKLE, UNSPECIFIED LIGAMENT, INITIAL ENCOUNTER: ICD-10-CM

## 2023-04-26 PROCEDURE — 97140 MANUAL THERAPY 1/> REGIONS: CPT

## 2023-04-26 PROCEDURE — 97110 THERAPEUTIC EXERCISES: CPT

## 2023-04-26 PROCEDURE — 97530 THERAPEUTIC ACTIVITIES: CPT

## 2023-04-26 PROCEDURE — 97112 NEUROMUSCULAR REEDUCATION: CPT

## 2023-04-27 NOTE — PROGRESS NOTES
[facility]   Physical Therapy Treatment Note     Name: Gunner Phillips  Clinic Number: [id number]    Therapy Diagnosis:   No diagnosis found.    Physician: Alonzo Sanz III, MD    Visit Date: 4/26/2023    Encounter Diagnosis   Name Primary?    Sprain of right ankle, unspecified ligament, initial encounter        Physician: Alonzo Sanz III, MD     Physician Orders: PT Eval and Treat  Medical Diagnosis from Referral: sprain of right ankle, unspecified ligament.   Evaluation Date: 4/5/2023  Updated Plan of Care Due : 05/05/23  Authorization Period Expiration: 03/27/24  Plan of Care Expiration: 05/05/23    Visit # / Visits authorized: 5/ 20  PTA Visit #: 0/5     Time In: 4:45 pm  Time Out: 5:29 pm  Total Billable Time: 41 minutes    SUBJECTIVE     Pt reports: arrived out of brace today but severe antalgic gait.  Pt stated it just felt really sore rather than painful though.  From her mom, she thinks it's more apprehension than anything.   She was compliant with home exercise program.  Response to previous treatment: good response with mod soreness after.   Functional change: Out of boot    Prior Level of Function: No limitations  Current Level of Function: WBAT just in brace at this point    OBJECTIVE     Able to SLS for 15 seconds today without pain.     Treatment     Gunner received the treatments listed below:      therapeutic exercises to develop strength, endurance, ROM, and flexibility for 15 minutes including:  Bike x 5 minutes  Slantboard Stretch [id number]  Ankle Circles x30 cw/ccw w/ grey band  Heel Raises at stairs 2x20  Heel Drop Stretch [id number]  Seated Heel Raises 2x20 w/ 40lb weight    manual therapy techniques: Joint mobilizations and Manual traction were applied to the: ankle for 8 minutes, including:  Joint mobs  Joint distraction  Ankle PROM    neuromuscular re-education activities to improve: Balance, Kinesthetic, and Proprioception for 8 minutes. The following  "activities were included:  Single Leg Balance w/ basketball pass 3x15 each leg  SL Leg Press [id number] w/ controlled eccentric    therapeutic activities to improve functional performance for 10 minutes, including:  TRX Squats 3x10 w/ chair behind to improve squatting ability  Step Ups 4x5 to 6" to improve stair negotiation  Trampoline Bounces [id number] for slow progression to plyos.     Patient Education and Home Exercises     Home Exercises Provided and Patient Education Provided     Education provided:   - None    Written Home Exercises Provided: Patient instructed to cont prior HEP. Exercises were reviewed and Gunner was able to demonstrate them prior to the end of the session.  Gunner demonstrated good  understanding of the education provided. See EMR under Patient Instructions for exercises provided during therapy sessions    ASSESSMENT     Going to hold off on discharge till at least next week with pt demonstrating a significant antalgic gait now that she is out of the lace up brace.  Do think there is a psycological component here, but will work to address these fears and get pt confident on the ankle.  Once distracted with basketball tosses didn't mention the pain as much, more limited due to weakness and stiffness.  Does have some signs of anterior impingment though.  Will continue with current plan.     Gunner Is progressing towards her goals.   Pt prognosis is Good.     Pt will continue to benefit from skilled outpatient physical therapy to address the deficits listed in the problem list box on initial evaluation, provide pt/family education and to maximize pt's level of independence in the home and community environment.     Pt's spiritual, cultural and educational needs considered and pt agreeable to plan of care and goals.     Anticipated barriers to physical therapy: None    Goals: Short Term Goals: 2 weeks   Pt will be independent with HEP for continued progression outside of skilled therapy.  Pt will " "be able to amb I without pain or deviation out of boot and without crutch without pain or limitation from R LE.  Pt will demonstrate 10 deg dorsiflexion without pain to show sufficient dorsiflexion range for functional activities.      Long Term Goals: 4 weeks   Pt will be able to perform a lateral step down from a 6" step without pain or limitation from R LE.  Pt will be able to perform 20 single leg calf raises to demonstrate 5/5 plantar flexor strength.  Pt will be able to perform 30 ankle hops without pain or limitation to allow for safe return to basketball.   Pt will be able to perform anterior Y balance reach test within 2cm of uninvolved side to demonstrate decreased risk for future injury.     PLAN     Plan of care Certification: 4/5/2023 to 5/5/23.     Outpatient Physical Therapy 2 times weekly for 4 weeks to include the following interventions: Manual Therapy, Neuromuscular Re-ed, Patient Education, Therapeutic Activities, and Therapeutic Exercise.        Mathew Quinn, PT   04/27/2023     "

## 2023-05-02 ENCOUNTER — CLINICAL SUPPORT (OUTPATIENT)
Dept: REHABILITATION | Facility: HOSPITAL | Age: 23
End: 2023-05-02
Payer: COMMERCIAL

## 2023-05-02 DIAGNOSIS — M25.571 ACUTE RIGHT ANKLE PAIN: Primary | ICD-10-CM

## 2023-05-02 DIAGNOSIS — M25.671 STIFFNESS OF RIGHT ANKLE JOINT: ICD-10-CM

## 2023-05-02 PROCEDURE — 97110 THERAPEUTIC EXERCISES: CPT | Mod: CQ

## 2023-05-02 PROCEDURE — 97112 NEUROMUSCULAR REEDUCATION: CPT | Mod: CQ

## 2023-05-02 PROCEDURE — 97530 THERAPEUTIC ACTIVITIES: CPT | Mod: CQ

## 2023-05-02 NOTE — PROGRESS NOTES
[facility]   Physical Therapy Treatment Note     Name: Gunner Phillips  Clinic Number: [id number]    Therapy Diagnosis:   Encounter Diagnoses   Name Primary?    Acute right ankle pain Yes    Stiffness of right ankle joint        Physician: Alonzo Sanz III, MD    Visit Date: 5/2/2023    Encounter Diagnosis   Name Primary?    Sprain of right ankle, unspecified ligament, initial encounter        Physician: Alonzo Sanz III, MD     Physician Orders: PT Eval and Treat  Medical Diagnosis from Referral: sprain of right ankle, unspecified ligament.   Evaluation Date: 4/5/2023  Updated Plan of Care Due : 05/05/23  Authorization Period Expiration: 03/27/24  Plan of Care Expiration: 05/05/23    Visit # / Visits authorized: 6 / 20  PTA Visit #: 1/5     Time In: 4:39 pm  Time Out: 5:17 pm  Total Billable Time: 38 minutes    SUBJECTIVE     Pt reports: she is feeling better but still has some pain in her ankle.   She was compliant with home exercise program.  Response to previous treatment: good response with mod soreness after.   Functional change: Out of boot    Prior Level of Function: No limitations  Current Level of Function: WBAT just in brace at this point    OBJECTIVE     Able to SLS for 15 seconds today without pain.     Treatment     Gunner received the treatments listed below:      therapeutic exercises to develop strength, endurance, ROM, and flexibility for 20 minutes including:  Bike x 5 minutes  Slantboard Stretch [id number]  Ankle Circles x30 cw/ccw w/ grey band  Heel Raises at stairs 2x20  DF Mobs @ Step 20x   Heel Drop Stretch [id number]  Seated Heel Raises 2x20 w/ 40lb weight    manual therapy techniques: Joint mobilizations and Manual traction were applied to the: ankle for 0 minutes, including:  Joint mobs  Joint distraction  Ankle PROM    neuromuscular re-education activities to improve: Balance, Kinesthetic, and Proprioception for 8 minutes. The following activities were  "included:  Single Leg Balance w/ basketball pass 3x15 each leg  DL Leg Press [id number] w/ controlled eccentric    therapeutic activities to improve functional performance for 10 minutes, including:  Staggered Stance sit to stands 3x10  Step Ups 4x5 to 6" to improve stair negotiation  Trampoline Bounces [id number] for slow progression to plyos.     Patient Education and Home Exercises     Home Exercises Provided and Patient Education Provided     Education provided:   - None    Written Home Exercises Provided: Patient instructed to cont prior HEP. Exercises were reviewed and Gunner was able to demonstrate them prior to the end of the session.  Gunner demonstrated good  understanding of the education provided. See EMR under Patient Instructions for exercises provided during therapy sessions    ASSESSMENT     Pt is doing well and gait pattern is much better today. Pt has stiffness in her ankle joint with no change with any mobilization or MWM. Progressed LOWER EXTREMITY strengthening and balance exercises with fatigue noted. Pt is doing better each week and will continue to benefit from strengthening/stretching routine. Will continue to progress as able.     Gunner Is progressing towards her goals.   Pt prognosis is Good.     Pt will continue to benefit from skilled outpatient physical therapy to address the deficits listed in the problem list box on initial evaluation, provide pt/family education and to maximize pt's level of independence in the home and community environment.     Pt's spiritual, cultural and educational needs considered and pt agreeable to plan of care and goals.     Anticipated barriers to physical therapy: None    Goals: Short Term Goals: 2 weeks   Pt will be independent with HEP for continued progression outside of skilled therapy.  Pt will be able to amb I without pain or deviation out of boot and without crutch without pain or limitation from R LE.  Pt will demonstrate 10 deg dorsiflexion without " "pain to show sufficient dorsiflexion range for functional activities.      Long Term Goals: 4 weeks   Pt will be able to perform a lateral step down from a 6" step without pain or limitation from R LE.  Pt will be able to perform 20 single leg calf raises to demonstrate 5/5 plantar flexor strength.  Pt will be able to perform 30 ankle hops without pain or limitation to allow for safe return to basketball.   Pt will be able to perform anterior Y balance reach test within 2cm of uninvolved side to demonstrate decreased risk for future injury.     PLAN     Plan of care Certification: 4/5/2023 to 5/5/23.     Outpatient Physical Therapy 2 times weekly for 4 weeks to include the following interventions: Manual Therapy, Neuromuscular Re-ed, Patient Education, Therapeutic Activities, and Therapeutic Exercise.        Brennen Bear, PTA   05/02/2023       "

## 2023-05-04 ENCOUNTER — CLINICAL SUPPORT (OUTPATIENT)
Dept: REHABILITATION | Facility: HOSPITAL | Age: 23
End: 2023-05-04
Payer: COMMERCIAL

## 2023-05-04 DIAGNOSIS — M25.671 STIFFNESS OF RIGHT ANKLE JOINT: ICD-10-CM

## 2023-05-04 DIAGNOSIS — R26.2 DIFFICULTY WALKING: ICD-10-CM

## 2023-05-04 DIAGNOSIS — M25.571 ACUTE RIGHT ANKLE PAIN: Primary | ICD-10-CM

## 2023-05-04 PROCEDURE — 97530 THERAPEUTIC ACTIVITIES: CPT

## 2023-05-04 PROCEDURE — 97112 NEUROMUSCULAR REEDUCATION: CPT

## 2023-05-04 PROCEDURE — 97110 THERAPEUTIC EXERCISES: CPT

## 2023-05-04 PROCEDURE — 97140 MANUAL THERAPY 1/> REGIONS: CPT

## 2023-05-04 NOTE — PROGRESS NOTES
[facility]   Physical Therapy Treatment Note     Name: Gunner Phillips  Clinic Number: [id number]    Therapy Diagnosis:   Encounter Diagnoses   Name Primary?    Acute right ankle pain Yes    Stiffness of right ankle joint     Difficulty walking        Physician: Alonzo Sanz III, MD    Visit Date: 5/4/2023    Encounter Diagnosis   Name Primary?    Sprain of right ankle, unspecified ligament, initial encounter        Physician: Alonzo Sanz III, MD     Physician Orders: PT Eval and Treat  Medical Diagnosis from Referral: sprain of right ankle, unspecified ligament.   Evaluation Date: 4/5/2023  Updated Plan of Care Due : 05/31/23  Authorization Period Expiration: 03/27/24  Plan of Care Expiration: 05/05/23    Visit # / Visits authorized: 7 / 20  PTA Visit #: 0/5     Time In: 4:40 pm  Time Out: 5:22 pm  Total Billable Time: 42 minutes    SUBJECTIVE     Pt reports: reports that ankle feeling good on arrival but still painful when she is active or trying to play basketball.   She was compliant with home exercise program.  Response to previous treatment: good response with mod soreness after.   Functional change: Out of boot    Prior Level of Function: No limitations  Current Level of Function: WBAT just in brace at this point    OBJECTIVE     SLS on R of 15 seconds.    R ankle dorsiflexion of 7 deg, still with signs of anterior impingement when passively dorsiflexing.    3+/5 strength w/ R plantar flexion unable to single leg heel raise multiple reps on R.     Treatment     Gunner received the treatments listed below:      therapeutic exercises to develop strength, endurance, ROM, and flexibility for 13 minutes including:  Bike x 5 minutes  Slantboard Stretch [id number]  Ankle Circles x30 cw/ccw w/ grey band  Heel Raises at stairs 2x20  Heel Drop Stretch [id number]  Seated Heel Raises 2x20 w/ 40lb weight    manual therapy techniques: Joint mobilizations and Manual traction were applied  "to the: ankle for 8 minutes, including:  PA tibial mobs in 1/2 kneeling.     neuromuscular re-education activities to improve: Balance, Kinesthetic, and Proprioception for 13 minutes. The following activities were included:  Step Ups 5x5 to 6" to improve stair negotiation  TRX Squats in staggered stance 4x10  DL Leg Press [id number] w/ controlled eccentric  SL Leg Press [id number] w/ controlled dorsiflexion end range    therapeutic activities to improve functional performance for 8 minutes, including:    Trampoline Bounces [id number] for slow progression to plyos.  Single Leg Balance w/ basketball pass 3x15 each leg    Patient Education and Home Exercises     Home Exercises Provided and Patient Education Provided     Education provided:   - None    Written Home Exercises Provided: Patient instructed to cont prior HEP. Exercises were reviewed and Gunner was able to demonstrate them prior to the end of the session.  Gunner demonstrated good  understanding of the education provided. See EMR under Patient Instructions for exercises provided during therapy sessions    ASSESSMENT     Overall has made good progress here, but think at this point it's more a lack of confidence on the ankle and comfort in the brace that we are fighting here.  Does have an aspect of anterior impingement here but don't think that should be limiting her gait as much as she is deviating.  Will update plan to include 2 more weeks to help her get over the psychological component and continue working ROM and strength.     Gunner Is progressing towards her goals.   Pt prognosis is Good.     Pt will continue to benefit from skilled outpatient physical therapy to address the deficits listed in the problem list box on initial evaluation, provide pt/family education and to maximize pt's level of independence in the home and community environment.     Pt's spiritual, cultural and educational needs considered and pt agreeable to plan of care and goals.   " "  Anticipated barriers to physical therapy: None    Goals: Short Term Goals: 2 weeks   Pt will be independent with HEP for continued progression outside of skilled therapy. MET  Pt will be able to amb I without pain or deviation out of boot and without crutch without pain or limitation from R LE. NOT MET  Pt will demonstrate 10 deg dorsiflexion without pain to show sufficient dorsiflexion range for functional activities. NOT MET     Long Term Goals: 4 weeks   Pt will be able to perform a lateral step down from a 6" step without pain or limitation from R LE. MET  Pt will be able to perform 20 single leg calf raises to demonstrate 5/5 plantar flexor strength. NOT MET  Pt will be able to perform 30 ankle hops without pain or limitation to allow for safe return to basketball. NOT MET  Pt will be able to perform anterior Y balance reach test within 2cm of uninvolved side to demonstrate decreased risk for future injury. NOT MET    PLAN     Plan of care Certification: 5/4/23-5/31/23.     Outpatient Physical Therapy 2 times weekly for 2 weeks to include the following interventions: Manual Therapy, Neuromuscular Re-ed, Patient Education, Therapeutic Activities, and Therapeutic Exercise.     Mathew Quinn, PT   05/05/2023     "

## 2023-05-05 NOTE — PLAN OF CARE
[facility]   Physical Therapy Progress Note    Name: Gunner Phillips  Clinic Number: [id number]    Therapy Diagnosis:   Encounter Diagnoses   Name Primary?    Acute right ankle pain Yes    Stiffness of right ankle joint     Difficulty walking        Physician: Alonzo Sanz III, MD    Visit Date: 5/4/2023    Encounter Diagnosis   Name Primary?    Sprain of right ankle, unspecified ligament, initial encounter        Physician: Alonzo Sanz III, MD     Physician Orders: PT Eval and Treat  Medical Diagnosis from Referral: sprain of right ankle, unspecified ligament.   Evaluation Date: 4/5/2023  Updated Plan of Care Due : 05/31/23  Authorization Period Expiration: 03/27/24  Plan of Care Expiration: 05/05/23    Visit # / Visits authorized: 7 / 20  PTA Visit #: 0/5     Time In: 4:40 pm  Time Out: 5:22 pm  Total Billable Time: 42 minutes    SUBJECTIVE     Pt reports: reports that ankle feeling good on arrival but still painful when she is active or trying to play basketball.   She was compliant with home exercise program.  Response to previous treatment: good response with mod soreness after.   Functional change: Out of boot    Prior Level of Function: No limitations  Current Level of Function: WBAT just in brace at this point    OBJECTIVE     SLS on R of 15 seconds.    R ankle dorsiflexion of 7 deg, still with signs of anterior impingement when passively dorsiflexing.    3+/5 strength w/ R plantar flexion unable to single leg heel raise multiple reps on R.     Treatment     Gunner received the treatments listed below:      therapeutic exercises to develop strength, endurance, ROM, and flexibility for 13 minutes including:  Bike x 5 minutes  Slantboard Stretch [id number]  Ankle Circles x30 cw/ccw w/ grey band  Heel Raises at stairs 2x20  Heel Drop Stretch [id number]  Seated Heel Raises 2x20 w/ 40lb weight    manual therapy techniques: Joint mobilizations and Manual traction were applied to  "the: ankle for 8 minutes, including:  PA tibial mobs in 1/2 kneeling.     neuromuscular re-education activities to improve: Balance, Kinesthetic, and Proprioception for 13 minutes. The following activities were included:  Step Ups 5x5 to 6" to improve stair negotiation  TRX Squats in staggered stance 4x10  DL Leg Press [id number] w/ controlled eccentric  SL Leg Press [id number] w/ controlled dorsiflexion end range    therapeutic activities to improve functional performance for 8 minutes, including:    Trampoline Bounces 0x45nog for slow progression to plyos.  Single Leg Balance w/ basketball pass 3x15 each leg    Patient Education and Home Exercises     Home Exercises Provided and Patient Education Provided     Education provided:   - None    Written Home Exercises Provided: Patient instructed to cont prior HEP. Exercises were reviewed and Gunner was able to demonstrate them prior to the end of the session.  Gunner demonstrated good  understanding of the education provided. See EMR under Patient Instructions for exercises provided during therapy sessions    ASSESSMENT     Overall has made good progress here, but think at this point it's more a lack of confidence on the ankle and comfort in the brace that we are fighting here.  Does have an aspect of anterior impingement here but don't think that should be limiting her gait as much as she is deviating.  Will update plan to include 2 more weeks to help her get over the psychological component and continue working ROM and strength.     Gunner Is progressing towards her goals.   Pt prognosis is Good.     Pt will continue to benefit from skilled outpatient physical therapy to address the deficits listed in the problem list box on initial evaluation, provide pt/family education and to maximize pt's level of independence in the home and community environment.     Pt's spiritual, cultural and educational needs considered and pt agreeable to plan of care and goals.   " "  Anticipated barriers to physical therapy: None    Goals: Short Term Goals: 2 weeks   Pt will be independent with HEP for continued progression outside of skilled therapy. MET  Pt will be able to amb I without pain or deviation out of boot and without crutch without pain or limitation from R LE. NOT MET  Pt will demonstrate 10 deg dorsiflexion without pain to show sufficient dorsiflexion range for functional activities. NOT MET     Long Term Goals: 4 weeks   Pt will be able to perform a lateral step down from a 6" step without pain or limitation from R LE. MET  Pt will be able to perform 20 single leg calf raises to demonstrate 5/5 plantar flexor strength. NOT MET  Pt will be able to perform 30 ankle hops without pain or limitation to allow for safe return to basketball. NOT MET  Pt will be able to perform anterior Y balance reach test within 2cm of uninvolved side to demonstrate decreased risk for future injury. NOT MET    PLAN     Plan of care Certification: 5/4/23-5/31/23.     Outpatient Physical Therapy 2 times weekly for 2 weeks to include the following interventions: Manual Therapy, Neuromuscular Re-ed, Patient Education, Therapeutic Activities, and Therapeutic Exercise.     Mathew Quinn, PT   05/05/2023     "

## 2023-05-09 ENCOUNTER — CLINICAL SUPPORT (OUTPATIENT)
Dept: REHABILITATION | Facility: HOSPITAL | Age: 23
End: 2023-05-09
Payer: COMMERCIAL

## 2023-05-09 DIAGNOSIS — M25.571 ACUTE RIGHT ANKLE PAIN: Primary | ICD-10-CM

## 2023-05-09 PROCEDURE — 97112 NEUROMUSCULAR REEDUCATION: CPT | Mod: CQ

## 2023-05-09 PROCEDURE — 97110 THERAPEUTIC EXERCISES: CPT | Mod: CQ

## 2023-05-09 PROCEDURE — 97530 THERAPEUTIC ACTIVITIES: CPT | Mod: CQ

## 2023-05-09 NOTE — PROGRESS NOTES
[facility]   Physical Therapy Treatment Note     Name: Gunner Phillips  Clinic Number: [id number]    Therapy Diagnosis:   Encounter Diagnosis   Name Primary?    Acute right ankle pain Yes       Physician: Alonzo Sanz III, MD    Visit Date: 5/9/2023    Encounter Diagnosis   Name Primary?    Sprain of right ankle, unspecified ligament, initial encounter        Physician: Alonzo Sanz III, MD     Physician Orders: PT Eval and Treat  Medical Diagnosis from Referral: sprain of right ankle, unspecified ligament.   Evaluation Date: 4/5/2023  Updated Plan of Care Due : 05/31/23  Authorization Period Expiration: 03/27/24  Plan of Care Expiration: 05/05/23    Visit # / Visits authorized: 7 / 20  PTA Visit #: 0/5     Time In: 4:40 pm  Time Out: 5:18 pm  Total Billable Time: 38 minutes    SUBJECTIVE     Pt reports: reports that ankle feeling good on arrival but still painful when she is active or trying to play basketball.   She was compliant with home exercise program.  Response to previous treatment: good response with mod soreness after.   Functional change: Out of boot    Prior Level of Function: No limitations  Current Level of Function: WBAT just in brace at this point    OBJECTIVE     SLS on R of 15 seconds.    R ankle dorsiflexion of 7 deg, still with signs of anterior impingement when passively dorsiflexing.    3+/5 strength w/ R plantar flexion unable to single leg heel raise multiple reps on R.     Treatment     Gunner received the treatments listed below:      therapeutic exercises to develop strength, endurance, ROM, and flexibility for 15 minutes including:  Bike x 5 minutes  Slantboard Stretch [id number]  Ankle Circles x30 cw/ccw w/ green band  Heel Raises at stairs 2x20  Heel Drop Stretch [id number]  Seated Heel Raises 2x20 w/ 40lb weight    manual therapy techniques: Joint mobilizations and Manual traction were applied to the: ankle for 0 minutes, including:  PA tibial mobs in  "1/2 kneeling.     neuromuscular re-education activities to improve: Balance, Kinesthetic, and Proprioception for 15 minutes. The following activities were included:  Step Ups 5x5 to 6" to improve stair negotiation  TRX Squats in staggered stance 4x10  DL Leg Press [id number] w/ controlled eccentric  SL Leg Press [id number] w/ controlled dorsiflexion end range    therapeutic activities to improve functional performance for 8 minutes, including:    Trampoline Bounces double leg 2x10   Single Leg Trampoline Bounces 2x30 seconds  Single Leg Balance w/ basketball pass 3x15 each leg    Patient Education and Home Exercises     Home Exercises Provided and Patient Education Provided     Education provided:   - None    Written Home Exercises Provided: Patient instructed to cont prior HEP. Exercises were reviewed and Gunner was able to demonstrate them prior to the end of the session.  Gunner demonstrated good  understanding of the education provided. See EMR under Patient Instructions for exercises provided during therapy sessions    ASSESSMENT     Pt is doing well and her gait pattern is much improved. Able to progress from double leg to single leg hops on trampoline with no increased pain. Pt is still a little stiff with her ankle mobility but overall is improving. Will continue to progress as able.     Gunner Is progressing towards her goals.   Pt prognosis is Good.     Pt will continue to benefit from skilled outpatient physical therapy to address the deficits listed in the problem list box on initial evaluation, provide pt/family education and to maximize pt's level of independence in the home and community environment.     Pt's spiritual, cultural and educational needs considered and pt agreeable to plan of care and goals.     Anticipated barriers to physical therapy: None    Goals: Short Term Goals: 2 weeks   Pt will be independent with HEP for continued progression outside of skilled therapy. MET  Pt will be able to " "amb I without pain or deviation out of boot and without crutch without pain or limitation from R LE. NOT MET  Pt will demonstrate 10 deg dorsiflexion without pain to show sufficient dorsiflexion range for functional activities. NOT MET     Long Term Goals: 4 weeks   Pt will be able to perform a lateral step down from a 6" step without pain or limitation from R LE. MET  Pt will be able to perform 20 single leg calf raises to demonstrate 5/5 plantar flexor strength. NOT MET  Pt will be able to perform 30 ankle hops without pain or limitation to allow for safe return to basketball. NOT MET  Pt will be able to perform anterior Y balance reach test within 2cm of uninvolved side to demonstrate decreased risk for future injury. NOT MET    PLAN     Plan of care Certification: 5/4/23-5/31/23.     Outpatient Physical Therapy 2 times weekly for 2 weeks to include the following interventions: Manual Therapy, Neuromuscular Re-ed, Patient Education, Therapeutic Activities, and Therapeutic Exercise.     Brennen Bear, PTA   05/09/2023       "

## 2023-05-11 ENCOUNTER — CLINICAL SUPPORT (OUTPATIENT)
Dept: REHABILITATION | Facility: HOSPITAL | Age: 23
End: 2023-05-11
Payer: COMMERCIAL

## 2023-05-11 DIAGNOSIS — S93.401A SPRAIN OF RIGHT ANKLE, UNSPECIFIED LIGAMENT, INITIAL ENCOUNTER: ICD-10-CM

## 2023-05-11 DIAGNOSIS — M25.571 ACUTE RIGHT ANKLE PAIN: Primary | ICD-10-CM

## 2023-05-11 PROCEDURE — 97110 THERAPEUTIC EXERCISES: CPT | Mod: CQ

## 2023-05-11 PROCEDURE — 97112 NEUROMUSCULAR REEDUCATION: CPT | Mod: CQ

## 2023-05-11 PROCEDURE — 97140 MANUAL THERAPY 1/> REGIONS: CPT | Mod: CQ

## 2023-05-11 NOTE — PROGRESS NOTES
[facility]   Physical Therapy Treatment Note     Name: Gunner Phillips  Clinic Number: [id number]    Therapy Diagnosis:   Encounter Diagnoses   Name Primary?    Acute right ankle pain Yes    Sprain of right ankle, unspecified ligament, initial encounter        Physician: Alonzo Sanz III, MD    Visit Date: 5/11/2023    Encounter Diagnosis   Name Primary?    Sprain of right ankle, unspecified ligament, initial encounter        Physician: Alonzo Sanz III, MD     Physician Orders: PT Eval and Treat  Medical Diagnosis from Referral: sprain of right ankle, unspecified ligament.   Evaluation Date: 4/5/2023  Updated Plan of Care Due : 05/31/23  Authorization Period Expiration: 03/27/24  Plan of Care Expiration: 05/05/23    Visit # / Visits authorized: 9 / 20  PTA Visit #: 2/5     Time In: 4:44 pm  Time Out: 5:19 pm  Total Billable Time: 35 minutes    SUBJECTIVE     Pt reports: she is doing good, some soreness noted  She was compliant with home exercise program.  Response to previous treatment: good response with mod soreness after.   Functional change: Out of boot    Prior Level of Function: No limitations  Current Level of Function: WBAT just in brace at this point    OBJECTIVE     SLS on R of 15 seconds.    R ankle dorsiflexion of 7 deg, still with signs of anterior impingement when passively dorsiflexing.    3+/5 strength w/ R plantar flexion unable to single leg heel raise multiple reps on R.     Treatment     Gunner received the treatments listed below:      therapeutic exercises to develop strength, endurance, ROM, and flexibility for 11 minutes including:  Bike x 5 minutes  Slantboard Stretch [id number]  Heel Raises at stairs 2x20  Heel Drop Stretch 5x62ugm  Seated Heel Raises 2x20 w/ 40lb weight    manual therapy techniques: Joint mobilizations and Manual traction were applied to the: ankle for 0 minutes, including:  PA tibial mobs in 1/2 kneeling.     neuromuscular  "re-education activities to improve: Balance, Kinesthetic, and Proprioception for 15 minutes. The following activities were included:  Step Ups 5x5 to 6" to improve stair negotiation  DL Leg Press [id number] w/ controlled eccentric  SL Leg Press [id number] w/ controlled dorsiflexion end range  P/A Weight Shifts on Wobble board 20x each way    therapeutic activities to improve functional performance for 8 minutes, including:    Trampoline Bounces double leg 2x10   Single Leg Trampoline Bounces 2x10   Single Leg Balance w/ basketball pass 3x10    Patient Education and Home Exercises     Home Exercises Provided and Patient Education Provided     Education provided:   - None    Written Home Exercises Provided: Patient instructed to cont prior HEP. Exercises were reviewed and Gunner was able to demonstrate them prior to the end of the session.  Gunner demonstrated good  understanding of the education provided. See EMR under Patient Instructions for exercises provided during therapy sessions    ASSESSMENT     Pt is doing well and her gait pattern is much improved. Able to progress from double leg to single leg hops on trampoline with no increased pain. Pt is still a little stiff with her ankle mobility but overall is improving. Will continue to progress as able.     Gunner Is progressing towards her goals.   Pt prognosis is Good.     Pt will continue to benefit from skilled outpatient physical therapy to address the deficits listed in the problem list box on initial evaluation, provide pt/family education and to maximize pt's level of independence in the home and community environment.     Pt's spiritual, cultural and educational needs considered and pt agreeable to plan of care and goals.     Anticipated barriers to physical therapy: None    Goals: Short Term Goals: 2 weeks   Pt will be independent with HEP for continued progression outside of skilled therapy. MET  Pt will be able to amb I without pain or deviation out of " "boot and without crutch without pain or limitation from R LE. NOT MET  Pt will demonstrate 10 deg dorsiflexion without pain to show sufficient dorsiflexion range for functional activities. NOT MET     Long Term Goals: 4 weeks   Pt will be able to perform a lateral step down from a 6" step without pain or limitation from R LE. MET  Pt will be able to perform 20 single leg calf raises to demonstrate 5/5 plantar flexor strength. NOT MET  Pt will be able to perform 30 ankle hops without pain or limitation to allow for safe return to basketball. NOT MET  Pt will be able to perform anterior Y balance reach test within 2cm of uninvolved side to demonstrate decreased risk for future injury. NOT MET    PLAN     Plan of care Certification: 5/4/23-5/31/23.     Outpatient Physical Therapy 2 times weekly for 2 weeks to include the following interventions: Manual Therapy, Neuromuscular Re-ed, Patient Education, Therapeutic Activities, and Therapeutic Exercise.     Brennen Bear, PTA   05/11/2023         "

## 2023-05-16 ENCOUNTER — CLINICAL SUPPORT (OUTPATIENT)
Dept: REHABILITATION | Facility: HOSPITAL | Age: 23
End: 2023-05-16
Payer: COMMERCIAL

## 2023-05-16 DIAGNOSIS — M25.571 ACUTE RIGHT ANKLE PAIN: Primary | ICD-10-CM

## 2023-05-16 DIAGNOSIS — M25.671 STIFFNESS OF RIGHT ANKLE JOINT: ICD-10-CM

## 2023-05-16 DIAGNOSIS — R26.2 DIFFICULTY WALKING: ICD-10-CM

## 2023-05-16 PROCEDURE — 97110 THERAPEUTIC EXERCISES: CPT

## 2023-05-16 PROCEDURE — 97112 NEUROMUSCULAR REEDUCATION: CPT

## 2023-05-16 PROCEDURE — 97530 THERAPEUTIC ACTIVITIES: CPT

## 2023-05-16 PROCEDURE — 97140 MANUAL THERAPY 1/> REGIONS: CPT

## 2023-05-16 NOTE — PLAN OF CARE
"[facility]   Physical Therapy Discharge Summary    Name: Gunner Phillips  Clinic Number: [id number]    Therapy Diagnosis:   No diagnosis found.      Physician: Alonzo Sanz III, MD    Visit Date: 5/16/2023    Encounter Diagnosis   Name Primary?    Sprain of right ankle, unspecified ligament, initial encounter        Physician: Alonzo Sanz III, MD     Physician Orders: PT Eval and Treat  Medical Diagnosis from Referral: sprain of right ankle, unspecified ligament.   Evaluation Date: 4/5/2023  Updated Plan of Care Due : 05/31/23  Authorization Period Expiration: 03/27/24  Plan of Care Expiration: 05/05/23    Visit # / Visits authorized: 9 / 20  PTA Visit #: 2/5     Time In: 04:00PM  Time Out: 04:38PM  Total Billable Time: 32 minutes (6 min NB)    SUBJECTIVE     Pt reports: denies any pain at this point.   She was compliant with home exercise program.  Response to previous treatment: only soreness  Functional change: Out of boot with only minimal gait deviation when in brace    Prior Level of Function: No limitations  Current Level of Function: WBAT just in brace at this point    OBJECTIVE     SLS on R of 15 seconds.    R ankle dorsiflexion of 7 deg    4/5 plantar flexion bilaterally    Treatment     Gunner received the treatments listed below:      therapeutic exercises to develop strength, endurance, ROM, and flexibility for 8 minutes including:  Bike x 5 minutes  Slantboard Stretch [id number]  Heel Raises at stairs 2x20  Heel Drop Stretch [id number]  Seated Heel Raises 3x20 w/ 40lb weight    neuromuscular re-education activities to improve: Balance, Kinesthetic, and Proprioception for 8 minutes. The following activities were included:  Step Ups 5x5 to 6" to improve stair negotiation  DL Leg Press [id number] w/ controlled eccentric  P/A Weight Shifts on Wobble board 20x each way    therapeutic activities to improve functional performance for 8 minutes, including:    Trampoline " "Bounces double leg 2x10   Single Leg Trampoline Bounces 2x10   Single Leg Balance w/ basketball pass 3x10    Manuals for 8 min including:  PA ankle mobilizations w/ midfoot mobilizations    Patient Education and Home Exercises     Home Exercises Provided and Patient Education Provided     Education provided:   - None    Written Home Exercises Provided: Patient instructed to cont prior HEP. Exercises were reviewed and Gunner was able to demonstrate them prior to the end of the session.  Gunner demonstrated good  understanding of the education provided. See EMR under Patient Instructions for exercises provided during therapy sessions    ASSESSMENT     Pt has done well with therapy.  Walking much better at this point but still in brace pretty much full time.  Think this is more of an apprehension about not being in the brace than needing it at this point.  Will discharge at this time.     Gunner Is progressing towards her goals.   Pt prognosis is Good.     Pt will continue to benefit from skilled outpatient physical therapy to address the deficits listed in the problem list box on initial evaluation, provide pt/family education and to maximize pt's level of independence in the home and community environment.     Pt's spiritual, cultural and educational needs considered and pt agreeable to plan of care and goals.     Anticipated barriers to physical therapy: None    Goals: Short Term Goals: 2 weeks   Pt will be independent with HEP for continued progression outside of skilled therapy. MET  Pt will be able to amb I without pain or deviation out of boot and without crutch without pain or limitation from R LE. MET  Pt will demonstrate 10 deg dorsiflexion without pain to show sufficient dorsiflexion range for functional activities. At 7 deg currently     Long Term Goals: 4 weeks   Pt will be able to perform a lateral step down from a 6" step without pain or limitation from R LE. MET  Pt will be able to perform 20 single leg " calf raises to demonstrate 5/5 plantar flexor strength. Able to get 12  Pt will be able to perform 30 ankle hops without pain or limitation to allow for safe return to basketball. Able bilaterally  Pt will be able to perform anterior Y balance reach test within 2cm of uninvolved side to demonstrate decreased risk for future injury. Apprehension, balance limited    PLAN     Plan of care Certification: 5/4/23-5/31/23.     Discharge due to being independent with HEP.     Mathew Quinn, PT   05/16/2023

## 2023-05-16 NOTE — PROGRESS NOTES
"[facility]   Physical Therapy Treatment Note     Name: Gunner Phillips  Clinic Number: [id number]    Therapy Diagnosis:   No diagnosis found.      Physician: Alonzo Sanz III, MD    Visit Date: 5/16/2023    Encounter Diagnosis   Name Primary?    Sprain of right ankle, unspecified ligament, initial encounter        Physician: Alonzo Sanz III, MD     Physician Orders: PT Eval and Treat  Medical Diagnosis from Referral: sprain of right ankle, unspecified ligament.   Evaluation Date: 4/5/2023  Updated Plan of Care Due : 05/31/23  Authorization Period Expiration: 03/27/24  Plan of Care Expiration: 05/05/23    Visit # / Visits authorized: 9 / 20  PTA Visit #: 2/5     Time In: 04:00PM  Time Out: 04:38PM  Total Billable Time: 32 minutes (6 min NB)    SUBJECTIVE     Pt reports: denies any pain at this point.   She was compliant with home exercise program.  Response to previous treatment: only soreness  Functional change: Out of boot with only minimal gait deviation when in brace    Prior Level of Function: No limitations  Current Level of Function: WBAT just in brace at this point    OBJECTIVE     SLS on R of 15 seconds.    R ankle dorsiflexion of 7 deg    4/5 plantar flexion bilaterally    Treatment     Gunner received the treatments listed below:      therapeutic exercises to develop strength, endurance, ROM, and flexibility for 8 minutes including:  Bike x 5 minutes  Slantboard Stretch [id number]  Heel Raises at stairs 2x20  Heel Drop Stretch [id number]  Seated Heel Raises 3x20 w/ 40lb weight    neuromuscular re-education activities to improve: Balance, Kinesthetic, and Proprioception for 8 minutes. The following activities were included:  Step Ups 5x5 to 6" to improve stair negotiation  DL Leg Press [id number] w/ controlled eccentric  P/A Weight Shifts on Wobble board 20x each way    therapeutic activities to improve functional performance for 8 minutes, including:    Trampoline Bounces " "double leg 2x10   Single Leg Trampoline Bounces 2x10   Single Leg Balance w/ basketball pass 3x10    Manuals for 8 min including:  PA ankle mobilizations w/ midfoot mobilizations    Patient Education and Home Exercises     Home Exercises Provided and Patient Education Provided     Education provided:   - None    Written Home Exercises Provided: Patient instructed to cont prior HEP. Exercises were reviewed and Gunner was able to demonstrate them prior to the end of the session.  Gunner demonstrated good  understanding of the education provided. See EMR under Patient Instructions for exercises provided during therapy sessions    ASSESSMENT     Pt has done well with therapy.  Walking much better at this point but still in brace pretty much full time.  Think this is more of an apprehension about not being in the brace than needing it at this point.  Will discharge at this time.     Gunner Is progressing towards her goals.   Pt prognosis is Good.     Pt will continue to benefit from skilled outpatient physical therapy to address the deficits listed in the problem list box on initial evaluation, provide pt/family education and to maximize pt's level of independence in the home and community environment.     Pt's spiritual, cultural and educational needs considered and pt agreeable to plan of care and goals.     Anticipated barriers to physical therapy: None    Goals: Short Term Goals: 2 weeks   Pt will be independent with HEP for continued progression outside of skilled therapy. MET  Pt will be able to amb I without pain or deviation out of boot and without crutch without pain or limitation from R LE. MET  Pt will demonstrate 10 deg dorsiflexion without pain to show sufficient dorsiflexion range for functional activities. At 7 deg currently     Long Term Goals: 4 weeks   Pt will be able to perform a lateral step down from a 6" step without pain or limitation from R LE. MET  Pt will be able to perform 20 single leg calf " raises to demonstrate 5/5 plantar flexor strength. Able to get 12  Pt will be able to perform 30 ankle hops without pain or limitation to allow for safe return to basketball. Able bilaterally  Pt will be able to perform anterior Y balance reach test within 2cm of uninvolved side to demonstrate decreased risk for future injury. Apprehension, balance limited    PLAN     Plan of care Certification: 5/4/23-5/31/23.     Discharge due to being independent with HEP.     Mathew Quinn, PT   05/16/2023

## 2025-01-03 ENCOUNTER — OFFICE VISIT (OUTPATIENT)
Dept: FAMILY MEDICINE | Facility: CLINIC | Age: 25
End: 2025-01-03
Payer: COMMERCIAL

## 2025-01-03 VITALS
DIASTOLIC BLOOD PRESSURE: 75 MMHG | BODY MASS INDEX: 27.22 KG/M2 | HEIGHT: 61 IN | SYSTOLIC BLOOD PRESSURE: 114 MMHG | HEART RATE: 99 BPM | WEIGHT: 144.19 LBS | OXYGEN SATURATION: 98 % | TEMPERATURE: 98 F

## 2025-01-03 DIAGNOSIS — J01.00 ACUTE NON-RECURRENT MAXILLARY SINUSITIS: Primary | ICD-10-CM

## 2025-01-03 DIAGNOSIS — R05.9 COUGH, UNSPECIFIED TYPE: ICD-10-CM

## 2025-01-03 DIAGNOSIS — R09.81 NASAL CONGESTION: ICD-10-CM

## 2025-01-03 DIAGNOSIS — Z20.828 EXPOSURE TO INFLUENZA: ICD-10-CM

## 2025-01-03 LAB
CTP QC/QA: YES
POC MOLECULAR INFLUENZA A AGN: NEGATIVE
POC MOLECULAR INFLUENZA B AGN: NEGATIVE

## 2025-01-03 RX ORDER — LEVOCETIRIZINE DIHYDROCHLORIDE 5 MG/1
5 TABLET, FILM COATED ORAL NIGHTLY
Qty: 30 TABLET | Refills: 1 | Status: SHIPPED | OUTPATIENT
Start: 2025-01-03

## 2025-01-03 RX ORDER — FLUTICASONE PROPIONATE 50 MCG
1 SPRAY, SUSPENSION (ML) NASAL DAILY
Qty: 11.1 ML | Refills: 0 | Status: SHIPPED | OUTPATIENT
Start: 2025-01-03

## 2025-01-03 RX ORDER — AZITHROMYCIN 250 MG/1
TABLET, FILM COATED ORAL
Qty: 6 TABLET | Refills: 0 | Status: SHIPPED | OUTPATIENT
Start: 2025-01-03

## 2025-01-03 NOTE — PROGRESS NOTES
"   Kim Christina DNP   [address] [place] [address]     PATIENT NAME: Gunner Phillips  : 2000  DATE: 1/3/25  MRN: [id number]      Billing Provider: Kim Christina DNP  Level of Service:   Patient PCP Information       Provider PCP Type    Primary Doctor No General            Reason for Visit / Chief Complaint: Cough and Nasal Congestion       Update PCP  Update Chief Complaint         History of Present Illness / Problem Focused Workflow     Gunner Phillips presents to the clinic with Cough and Nasal Congestion     Cough    Review of Systems     Review of Systems   Respiratory:  Positive for cough.       Medical / Social / Family History   History reviewed. No pertinent past medical history.    History reviewed. No pertinent surgical history.    Social History  Ms.  reports that she has never smoked. She has never been exposed to tobacco smoke. She has never used smokeless tobacco. She reports that she does not drink alcohol and does not use drugs.    Family History  Ms.'s family history is not on file.    Medications and Allergies     Medications  No outpatient medications have been marked as taking for the 1/3/25 encounter (Office Visit) with Kim Christina DNP.       Allergies  Review of patient's allergies indicates:  No Known Allergies    Physical Examination   /75 (BP Location: Right arm, Patient Position: Sitting)   Pulse 99   Temp 98.2 °F (36.8 °C) (Oral)   Ht 5' 1" (1.549 m)   Wt 65.4 kg (144 lb 3.2 oz)   LMP 2024 (Approximate)   SpO2 98%   BMI 27.25 kg/m²    Physical Exam  Vitals and nursing note reviewed.   Constitutional:       Appearance: Normal appearance. She is obese. She is ill-appearing.   HENT:      Head: Normocephalic.      Ears:      Comments: Dull tm      Nose: Congestion and rhinorrhea present.      Mouth/Throat:      Mouth: Mucous membranes are moist.      Pharynx: Posterior oropharyngeal erythema present.   Eyes:      Extraocular " Movements: Extraocular movements intact.      Conjunctiva/sclera: Conjunctivae normal.      Pupils: Pupils are equal, round, and reactive to light.   Cardiovascular:      Rate and Rhythm: Normal rate and regular rhythm.      Pulses: Normal pulses.      Heart sounds: Normal heart sounds.   Pulmonary:      Effort: Pulmonary effort is normal.      Breath sounds: Normal breath sounds.   Abdominal:      General: Abdomen is flat. Bowel sounds are normal.      Palpations: Abdomen is soft.   Musculoskeletal:         General: Normal range of motion.      Cervical back: Normal range of motion.   Lymphadenopathy:      Cervical: Cervical adenopathy present.   Skin:     General: Skin is warm and dry.      Capillary Refill: Capillary refill takes less than 2 seconds.   Neurological:      General: No focal deficit present.      Mental Status: She is alert and oriented to person, place, and time. Mental status is at baseline.   Psychiatric:         Mood and Affect: Mood normal.         Behavior: Behavior normal.         Thought Content: Thought content normal.         Judgment: Judgment normal.        Assessment and Plan (including Health Maintenance)      Problem List  Smart Sets  Document Outside HM   :    Plan:         Health Maintenance Due   Topic Date Due    Hepatitis C Screening  Never done    Lipid Panel  Never done    HIV Screening  Never done    HPV Vaccines (2 - 3-dose series) 05/05/2015    Pap Smear  Never done    Influenza Vaccine (1) Never done    COVID-19 Vaccine (1 - 2024-25 season) Never done       Problem List Items Addressed This Visit          ENT    Acute non-recurrent maxillary sinusitis - Primary    Nasal congestion    Relevant Orders    POCT Influenza A/B Molecular (Completed)       Pulmonary    Cough    Relevant Orders    POCT Influenza A/B Molecular (Completed)       ID    Exposure to influenza       Health Maintenance Topics with due status: Not Due       Topic Last Completion Date    TETANUS  VACCINE 04/07/2015    RSV Vaccine (Age 60+ and Pregnant patients) Not Due       Future Appointments   Date Time Provider Department Center   1/17/2025  9:15 AM Kim Christina DNP [facility]            Signature:  Kim Christina DNP      [address] [place] [address]    Date of encounter: 1/3/25

## 2025-01-17 ENCOUNTER — OFFICE VISIT (OUTPATIENT)
Dept: FAMILY MEDICINE | Facility: CLINIC | Age: 25
End: 2025-01-17
Payer: COMMERCIAL

## 2025-01-17 VITALS
WEIGHT: 137.81 LBS | BODY MASS INDEX: 23.53 KG/M2 | TEMPERATURE: 98 F | HEART RATE: 110 BPM | OXYGEN SATURATION: 95 % | DIASTOLIC BLOOD PRESSURE: 79 MMHG | SYSTOLIC BLOOD PRESSURE: 114 MMHG | HEIGHT: 64 IN

## 2025-01-17 DIAGNOSIS — R06.2 WHEEZING: ICD-10-CM

## 2025-01-17 DIAGNOSIS — Z01.411 ENCNTR FOR GYN EXAM (GENERAL) (ROUTINE) W ABNORMAL FINDINGS: Primary | ICD-10-CM

## 2025-01-17 DIAGNOSIS — J18.9 PNEUMONIA OF RIGHT MIDDLE LOBE DUE TO INFECTIOUS ORGANISM: ICD-10-CM

## 2025-01-17 DIAGNOSIS — R62.50 DEVELOPMENTAL DELAY: ICD-10-CM

## 2025-01-17 DIAGNOSIS — Z23 NEED FOR VACCINATION: ICD-10-CM

## 2025-01-17 DIAGNOSIS — Z12.72 ENCOUNTER FOR SCREENING FOR MALIGNANT NEOPLASM OF VAGINA: ICD-10-CM

## 2025-01-17 DIAGNOSIS — F84.0 AUTISM: ICD-10-CM

## 2025-01-17 PROCEDURE — 88141 CYTOPATH C/V INTERPRET: CPT | Mod: ,,, | Performed by: PATHOLOGY

## 2025-01-17 PROCEDURE — 3074F SYST BP LT 130 MM HG: CPT | Mod: CPTII,,, | Performed by: NURSE PRACTITIONER

## 2025-01-17 PROCEDURE — 1159F MED LIST DOCD IN RCRD: CPT | Mod: CPTII,,, | Performed by: NURSE PRACTITIONER

## 2025-01-17 PROCEDURE — 88142 CYTOPATH C/V THIN LAYER: CPT | Mod: TC,GCY | Performed by: NURSE PRACTITIONER

## 2025-01-17 PROCEDURE — 90651 9VHPV VACCINE 2/3 DOSE IM: CPT | Mod: ,,, | Performed by: NURSE PRACTITIONER

## 2025-01-17 PROCEDURE — 90471 IMMUNIZATION ADMIN: CPT | Mod: ,,, | Performed by: NURSE PRACTITIONER

## 2025-01-17 PROCEDURE — 99395 PREV VISIT EST AGE 18-39: CPT | Mod: 25,,, | Performed by: NURSE PRACTITIONER

## 2025-01-17 PROCEDURE — 3008F BODY MASS INDEX DOCD: CPT | Mod: CPTII,,, | Performed by: NURSE PRACTITIONER

## 2025-01-17 PROCEDURE — 3078F DIAST BP <80 MM HG: CPT | Mod: CPTII,,, | Performed by: NURSE PRACTITIONER

## 2025-01-17 RX ORDER — METHYLPREDNISOLONE 4 MG/1
TABLET ORAL
Qty: 21 TABLET | Refills: 0 | Status: SHIPPED | OUTPATIENT
Start: 2025-01-17

## 2025-01-17 RX ORDER — DOXYCYCLINE 100 MG/1
100 CAPSULE ORAL 2 TIMES DAILY
Qty: 14 CAPSULE | Refills: 0 | Status: SHIPPED | OUTPATIENT
Start: 2025-01-17 | End: 2025-01-24

## 2025-01-17 NOTE — PROGRESS NOTES
Covered with doxy let family know pneumonia I need to see her back in 3 weeks with cxr. Please add her to our xray schedule and we will call them

## 2025-01-20 PROBLEM — F84.0 AUTISM: Status: ACTIVE | Noted: 2025-01-20

## 2025-01-20 PROBLEM — R62.50 DEVELOPMENTAL DELAY: Status: ACTIVE | Noted: 2025-01-20

## 2025-01-20 PROBLEM — J18.9 PNEUMONIA OF RIGHT MIDDLE LOBE DUE TO INFECTIOUS ORGANISM: Status: ACTIVE | Noted: 2025-01-20

## 2025-01-20 PROBLEM — R06.2 WHEEZING: Status: ACTIVE | Noted: 2025-01-20

## 2025-01-20 PROBLEM — Z01.411 ENCNTR FOR GYN EXAM (GENERAL) (ROUTINE) W ABNORMAL FINDINGS: Status: ACTIVE | Noted: 2025-01-20

## 2025-01-20 PROBLEM — Z12.72 ENCOUNTER FOR SCREENING FOR MALIGNANT NEOPLASM OF VAGINA: Status: ACTIVE | Noted: 2025-01-20

## 2025-01-20 PROBLEM — Z23 NEED FOR VACCINATION: Status: ACTIVE | Noted: 2025-01-20

## 2025-01-20 NOTE — PROGRESS NOTES
"   Kim Christina DNP   [address] [place] [address]     PATIENT NAME: Gunner Phillips  : 2000  DATE: 25  MRN: [id number]      Billing Provider: Kim Christina DNP  Level of Service:   Patient PCP Information       Provider PCP Type    Primary Doctor No General            Reason for Visit / Chief Complaint: Cough, Nasal Congestion, Fever, and Gynecologic Exam       Subjective:       Gunner Phillips is a 24 y.o. woman who comes in today for an initial pap smear with her mother and father   Family reports she has still been sick since her previous visit to clinic.   She is not sexually active, I do believe she understands what that means to an extent.   Contraception: abstinence      Denies urinary symptoms  N family hx of female reproductive cancers  N family hx of breast cancer   Any previous history of STDs?  N    The following portions of the patient's history were reviewed and updated as appropriate: allergies, current medications, past family history, past medical history, past social history, past surgical history, and problem list.    Review of Systems  Pertinent items are noted in HPI.     Objective:      /79 (BP Location: Right arm, Patient Position: Sitting)   Pulse 110   Temp 98 °F (36.7 °C)   Ht 5' 4" (1.626 m)   Wt 62.5 kg (137 lb 12.8 oz)   LMP 2024 (Approximate)   SpO2 95%   BMI 23.65 kg/m²     ENT normal.  Neck supple. No adenopathy or thyromegaly.NATE.   Lungs are clear, good air entry, no wheezes, rhonchi or rales.   BREAST EXAM: breasts appear normal, no suspicious masses, no skin or nipple changes or axillary nodes, risk and benefit of breast self-exam was discussed  S1 and S2 normal, no murmurs, regular rate and rhythm.   Pulm - coughing and wheezing noted mostly to rt side   Abdomen soft without tenderness, guarding, mass or organomegaly.   Pelvic Exam: external genitalia normal, no adnexal masses or tenderness, no bladder " tenderness, perianal skin: no external genital warts noted, positive findings: very limited internal exam - pt would not cooperate - unable to perform exam using speculum. She did allow me to digitally penetrate for a dew seconds hymen closed. Was able to use cervical swab to collect vaginal cells, urethra without abnormality or discharge, and vagina normal without discharge.   Pt tearful and ended exam. NO abnormality idenitified.  Both parents at bedside with her during exam as well as nurse    Extremities show no edema, normal peripheral pulses.   Neurological is normal, no focal findings.          Assessment:   Pap smear obtained. To best of my ability   Screening pap smear.   Encntr for gyn exam (general) (routine) w abnormal findings    Encounter for screening for malignant neoplasm of vagina  -     ThinPrep Pap Test    Wheezing  -     X-Ray Chest PA And Lateral    Need for vaccination  -     HPV Vaccine (9-Valent) (3 Dose) (IM)    Autism    Developmental delay    Pneumonia of right middle lobe due to infectious organism    Other orders  -     methylPREDNISolone (MEDROL DOSEPACK) 4 mg tablet; use as directed  Dispense: 21 tablet; Refill: 0  -     doxycycline (VIBRAMYCIN) 100 MG Cap; Take 1 capsule (100 mg total) by mouth 2 (two) times daily. for 7 days  Dispense: 14 capsule; Refill: 0          Problem List Items Addressed This Visit          Neuro    Autism    Developmental delay       Pulmonary    Pneumonia of right middle lobe due to infectious organism    Wheezing    Relevant Orders    X-Ray Chest PA And Lateral (Completed)       Renal/    Encounter for screening for malignant neoplasm of vagina - Primary    Relevant Orders    ThinPrep Pap Test       ID    Need for vaccination    Relevant Orders    HPV Vaccine (9-Valent) (3 Dose) (IM) (Completed)         Plan:   After exam - I discussed with patient and family to always be scared and never trust anyone that goes near or tries to touch her inappropriately.  To always tell her mother or trusted family member if someone attempts to touch her.   Treat Pneumonia   See back 2-3 weeks for repeat CXR    Follow up in 3-5years, or as indicated by Pap results.    Start mammogram screening at 40yo if tolerated

## 2025-01-24 LAB
GH SERPL-MCNC: ABNORMAL NG/ML
INSULIN SERPL-ACNC: ABNORMAL U[IU]/ML
LAB AP CLINICAL INFORMATION: ABNORMAL
LAB AP GYN INTERPRETATION: ABNORMAL
LAB AP PAP DISCLAIMER COMMENTS: ABNORMAL
RENIN PLAS-CCNC: ABNORMAL NG/ML/H

## 2025-01-27 NOTE — PROGRESS NOTES
She may have yeast or thrush in her mouth. We can do a nystatin suspension swish and swallow. Increase water or yogurt - must change toothbrush

## 2025-01-28 NOTE — PROGRESS NOTES
Yes you have already talked to her mother. She is autistic and was very difficult to get an exam to begin with. We discussed repeating in 1 year. Never been sexually active.

## 2025-01-29 RX ORDER — NYSTATIN 100000 [USP'U]/ML
6 SUSPENSION ORAL 4 TIMES DAILY
Qty: 240 ML | Refills: 0 | Status: SHIPPED | OUTPATIENT
Start: 2025-01-29 | End: 2025-02-08

## 2025-02-11 ENCOUNTER — CLINICAL SUPPORT (OUTPATIENT)
Dept: FAMILY MEDICINE | Facility: CLINIC | Age: 25
End: 2025-02-11
Payer: COMMERCIAL

## 2025-02-11 DIAGNOSIS — J18.9 PNEUMONIA OF RIGHT MIDDLE LOBE DUE TO INFECTIOUS ORGANISM: Primary | ICD-10-CM

## 2025-02-11 DIAGNOSIS — J18.9 PNEUMONIA OF RIGHT MIDDLE LOBE DUE TO INFECTIOUS ORGANISM: ICD-10-CM
